# Patient Record
Sex: MALE | Race: WHITE | NOT HISPANIC OR LATINO | Employment: FULL TIME | URBAN - NONMETROPOLITAN AREA
[De-identification: names, ages, dates, MRNs, and addresses within clinical notes are randomized per-mention and may not be internally consistent; named-entity substitution may affect disease eponyms.]

---

## 2018-02-19 ENCOUNTER — APPOINTMENT (OUTPATIENT)
Dept: OCCUPATIONAL MEDICINE | Facility: OTHER | Age: 58
End: 2018-02-19

## 2018-02-19 ENCOUNTER — APPOINTMENT (OUTPATIENT)
Dept: CHIROPRACTIC MEDICINE | Facility: OTHER | Age: 58
End: 2018-02-19

## 2018-02-19 PROCEDURE — 99499 UNLISTED E&M SERVICE: CPT

## 2018-03-28 ENCOUNTER — APPOINTMENT (OUTPATIENT)
Dept: OCCUPATIONAL MEDICINE | Facility: OTHER | Age: 58
End: 2018-03-28

## 2018-03-28 PROCEDURE — 94010 BREATHING CAPACITY TEST: CPT

## 2018-03-28 PROCEDURE — 99080 SPECIAL REPORTS OR FORMS: CPT

## 2019-02-11 ENCOUNTER — APPOINTMENT (OUTPATIENT)
Dept: CHIROPRACTIC MEDICINE | Facility: OTHER | Age: 59
End: 2019-02-11

## 2019-02-11 ENCOUNTER — APPOINTMENT (OUTPATIENT)
Dept: OCCUPATIONAL MEDICINE | Facility: OTHER | Age: 59
End: 2019-02-11

## 2019-02-11 PROCEDURE — 99499 UNLISTED E&M SERVICE: CPT

## 2019-03-20 ASSESSMENT — MIFFLIN-ST. JEOR
SCORE: 1470.78
SCORE: 1470.78

## 2019-03-23 ENCOUNTER — ANESTHESIA - HEALTHEAST (OUTPATIENT)
Dept: SURGERY | Facility: AMBULATORY SURGERY CENTER | Age: 59
End: 2019-03-23

## 2019-03-25 ENCOUNTER — SURGERY - HEALTHEAST (OUTPATIENT)
Dept: SURGERY | Facility: AMBULATORY SURGERY CENTER | Age: 59
End: 2019-03-25

## 2019-03-25 ENCOUNTER — HOSPITAL ENCOUNTER (OUTPATIENT)
Dept: SURGERY | Facility: AMBULATORY SURGERY CENTER | Age: 59
Discharge: HOME | End: 2019-03-25
Attending: ORTHOPAEDIC SURGERY | Admitting: ORTHOPAEDIC SURGERY

## 2019-03-25 DIAGNOSIS — M47.816 SPONDYLOSIS OF LUMBAR REGION WITHOUT MYELOPATHY OR RADICULOPATHY: ICD-10-CM

## 2019-03-25 ASSESSMENT — MIFFLIN-ST. JEOR
SCORE: 1470.78
SCORE: 1470.78

## 2021-03-15 ENCOUNTER — APPOINTMENT (OUTPATIENT)
Dept: OCCUPATIONAL MEDICINE | Facility: OTHER | Age: 61
End: 2021-03-15

## 2021-03-15 ENCOUNTER — APPOINTMENT (OUTPATIENT)
Dept: CHIROPRACTIC MEDICINE | Facility: OTHER | Age: 61
End: 2021-03-15

## 2021-03-15 PROCEDURE — 99499 UNLISTED E&M SERVICE: CPT

## 2021-05-27 NOTE — ANESTHESIA PREPROCEDURE EVALUATION
Anesthesia Evaluation      Patient summary reviewed     Airway   Mallampati: II   Pulmonary - normal exam   (+) a smoker    ROS comment: Pipe smoker                         Cardiovascular - negative ROS and normal exam  ECG reviewed ([facility])        Neuro/Psych    (+) chronic pain    Endo/Other - negative ROS      GI/Hepatic/Renal - negative ROS      Other findings: Hb 15.8, K 4.2      Dental - normal exam                        Anesthesia Plan  Planned anesthetic: MAC    ASA 2   Induction: intravenous   Anesthetic plan and risks discussed with: patient and spouse    Post-op plan: routine recovery

## 2021-05-27 NOTE — ANESTHESIA CARE TRANSFER NOTE
Last vitals:   Vitals:    03/25/19 0832   BP: 127/74   Pulse: 83   Resp: 16   Temp: 37.2  C (99  F)   SpO2: 97%     Patient's level of consciousness is awake  Spontaneous respirations: yes  Maintains airway independently: yes  Dentition unchanged: yes  Oropharynx: oropharynx clear of all foreign objects    QCDR Measures:  ASA# 20 - Surgical Safety Checklist: WHO surgical safety checklist completed prior to induction    PQRS# 430 - Adult PONV Prevention: 4558F - Pt received => 2 anti-emetic agents (different classes) preop & intraop  ASA# 8 - Peds PONV Prevention: 4558F - Pt received => 2 anti-emetic agents (different classes) preop & intraop  PQRS# 424 - Manju-op Temp Management: 4559F - At least one body temp DOCUMENTED => 35.5C or 95.9F within required timeframe  PQRS# 426 - PACU Transfer Protocol: - Transfer of care checklist used  ASA# 14 - Acute Post-op Pain: ASA14B - Patient did NOT experience pain >= 7 out of 10

## 2021-05-27 NOTE — ED NOTES
Pt forgot his script at surgery center.  Non narcotics called in to his home pharmacy.  Dr garrett updated-he is correct the oxycodone script by either escribing or ordering a different med.

## 2021-05-27 NOTE — OP NOTE
Operative Note    Name:  Thee Hernandez   PCP:  System, Provider Not In   Procedure Date: 3/25/2019       Procedure(s): Left lumbar 3 medial branch nerve transection. CPT code is 64570  Left lumbar 4 medial branch nerve transection.  CPT code is 50808  Left lumbar 5 medial branch nerve transection. CPT code is 29111    Pre-Procedure Diagnosis:  Lumbar spondylosis without radiculopathy    Post-Procedure Diagnosis:    SAME    Surgeon(s):  Vamshi Oliveros MD     No Physician Assistant or First Assist has been documented in procedure     Anesthesia Type:  MAC with propofol    Findings:  The left lumbar 3, 4, and 5 medial branch nerves were identified and then subsequently transected with a pituitary ronguer.  The ends of each nerves were then coagulated with monopolar electrocautery    Operative Report:    Patient was taken to the operating room and placed in a prone position.  He was given MAC anesthesia with use of propofol.  His lumbar region was prepped and draped in normal sterile fashion.  The appropriate starting position for the left lumbar 3, 4, and lumbar 5 medial branch nerves were identified and the skin, superficial subcutaneous tissues, and the course to the left lumbar 3 4 and 5 medial branch nerves were anesthetized with half percent Marcaine.  An incision was then made through the skin for each location and a dilator was advanced to the medial branch nerve position.  After this was confirmed with fluoroscopy a sleeve was advanced over the dilator and then subsequently scope was then inserted into the sleeve allowing for direct visualization of the medial branch nerve and the surrounding subcutaneous tissues.  I then used monopolar electrocautery to clear off the intersection of the transverse process and superior articular facet.  This allowed for direct visualization of the medial branch nerve.  Medial branch nerve was then removed with forceps and pituitary.  The ends of each medial branch nerves  were then coagulated with monopolar electrocautery.  After this was completed at the left lumbar 3 level the lumbar 4 level was then performed and then subsequently lumbar 5 medial branch nerve was identified and transected.    The wounds were then closed with 3-0 Vicryl, Dermabond, and Steri-Strip.  I then utilized a mixture of 20 cc Exparel and 10 cc half percent Marcaine to anesthetize the operative area surrounding subcutaneous tissues and skin.  Sterile dressing was placed overlying this and the patient was transported to the recovery room.    Estimated Blood Loss:   6 mL    Specimens:    None       Drains:   None    Complications:    None    Vamshi Oliveros MD   [phone]    Date: 3/25/2019   Time: 9:00 AM

## 2021-06-02 VITALS
WEIGHT: 150 LBS | HEIGHT: 69 IN | BODY MASS INDEX: 22.22 KG/M2 | BODY MASS INDEX: 22.22 KG/M2 | WEIGHT: 150 LBS | HEIGHT: 69 IN

## 2023-04-25 ENCOUNTER — OFFICE VISIT (OUTPATIENT)
Dept: CARE COORDINATION | Facility: OTHER | Age: 63
End: 2023-04-25
Attending: INTERNAL MEDICINE
Payer: COMMERCIAL

## 2023-04-25 VITALS
DIASTOLIC BLOOD PRESSURE: 92 MMHG | HEART RATE: 86 BPM | RESPIRATION RATE: 16 BRPM | OXYGEN SATURATION: 97 % | WEIGHT: 152 LBS | HEIGHT: 67 IN | BODY MASS INDEX: 23.86 KG/M2 | SYSTOLIC BLOOD PRESSURE: 172 MMHG | TEMPERATURE: 98.3 F

## 2023-04-25 DIAGNOSIS — Z86.0101 HISTORY OF ADENOMATOUS POLYP OF COLON: Primary | ICD-10-CM

## 2023-04-25 PROBLEM — D12.6 TUBULAR ADENOMA OF COLON: Status: ACTIVE | Noted: 2023-02-10

## 2023-04-25 PROCEDURE — G0463 HOSPITAL OUTPT CLINIC VISIT: HCPCS

## 2023-04-25 RX ORDER — METOPROLOL SUCCINATE 50 MG/1
50 TABLET, EXTENDED RELEASE ORAL DAILY
COMMUNITY

## 2023-04-25 RX ORDER — BISACODYL 5 MG/1
TABLET, DELAYED RELEASE ORAL
COMMUNITY
Start: 2023-02-09

## 2023-04-25 RX ORDER — POLYETHYLENE GLYCOL 3350 17 G/17G
POWDER, FOR SOLUTION ORAL
COMMUNITY
Start: 2023-02-09

## 2023-04-25 ASSESSMENT — PAIN SCALES - GENERAL: PAINLEVEL: EXTREME PAIN (8)

## 2023-04-25 NOTE — PROGRESS NOTES
"SUBJECTIVE:            hTee Hernandez is a 62 year old male, here in today for: Discussion regarding colonoscopy he has a history of tubular adenomata in 2 of 3 prior colonoscopies including the last which was just over 5 years ago due for follow-up since she is now out of network so I offered [facility] to wait until July after he comes back from a long camping trip I said that would be fine his questions were answered      ROS:  CONSTITUTIONAL: NEGATIVE for fever, chills, change in weight  ENT/MOUTH: NEGATIVE for ear, mouth and throat problems  RESP: NEGATIVE for significant cough or SOB  CV: NEGATIVE for chest pain, palpitations or peripheral edema    Problem list, Medication list, Allergies, and Medical/Social/Surgical histories reviewed in [facility] and updated as appropriate.    OBJECTIVE:                          BP (!) 172/92   Pulse 86   Temp 98.3  F (36.8  C) (Tympanic)   Resp 16   Ht 1.702 m (5' 7\")   Wt 68.9 kg (152 lb)   SpO2 97%   BMI 23.81 kg/m     GENERAL: healthy, alert, well nourished, well hydrated, no distress  HENT: ear canals- normal; TMs- normal; Nose- normal; Mouth- no ulcers, no lesions  NECK: no tenderness, no adenopathy, no asymmetry, no masses, no stiffness; thyroid- normal to palpation  RESP: lungs clear to auscultation - no rales, no rhonchi, no wheezes  CV: regular rates and rhythm, normal S1 S2, no S3 or S4 and no murmur, no click or rub -  ABDOMEN: soft, no tenderness, no  hepatosplenomegaly, no masses, normal bowel sounds     No results found for: WBC, HGB, HCT, PLT, CHOL, TRIG, HDL, LDLDIRECT, ALT, AST, NA, CREATININE, BUN, CO2, TSH, PSA, INR, GLUF, HGBA1C, MICROALBUR    ASSESSMENT/PLAN:              ICD-10-CM    1. History of adenomatous polyp of colon  Z86.010           Risks, benefits and alternatives of treatments discussed. Plan agreed on.      Followup: Colonoscopy July 2023    Will call, return to clinic, or go to ED if worsening or symptoms not improving as " discussed.